# Patient Record
Sex: FEMALE | Race: WHITE | NOT HISPANIC OR LATINO | URBAN - METROPOLITAN AREA
[De-identification: names, ages, dates, MRNs, and addresses within clinical notes are randomized per-mention and may not be internally consistent; named-entity substitution may affect disease eponyms.]

---

## 2022-07-07 ENCOUNTER — EMERGENCY (EMERGENCY)
Facility: HOSPITAL | Age: 72
LOS: 1 days | Discharge: HOME | End: 2022-07-07
Attending: STUDENT IN AN ORGANIZED HEALTH CARE EDUCATION/TRAINING PROGRAM | Admitting: EMERGENCY MEDICINE

## 2022-07-07 VITALS
HEIGHT: 65 IN | OXYGEN SATURATION: 98 % | TEMPERATURE: 98 F | SYSTOLIC BLOOD PRESSURE: 137 MMHG | DIASTOLIC BLOOD PRESSURE: 70 MMHG | HEART RATE: 87 BPM | WEIGHT: 199.96 LBS | RESPIRATION RATE: 18 BRPM
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
## 2022-07-07 DIAGNOSIS — Z88.0 ALLERGY STATUS TO PENICILLIN: ICD-10-CM

## 2022-07-07 DIAGNOSIS — Z88.5 ALLERGY STATUS TO NARCOTIC AGENT: ICD-10-CM

## 2022-07-07 DIAGNOSIS — T83.098A OTHER MECHANICAL COMPLICATION OF OTHER URINARY CATHETER, INITIAL ENCOUNTER: ICD-10-CM
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
## 2022-07-07 DIAGNOSIS — E11.9 TYPE 2 DIABETES MELLITUS WITHOUT COMPLICATIONS: ICD-10-CM

## 2022-07-07 DIAGNOSIS — K21.9 GASTRO-ESOPHAGEAL REFLUX DISEASE WITHOUT ESOPHAGITIS: ICD-10-CM

## 2022-07-07 DIAGNOSIS — H54.7 UNSPECIFIED VISUAL LOSS: ICD-10-CM

## 2022-07-07 NOTE — ED PROVIDER NOTE - NSFOLLOWUPINSTRUCTIONS_ED_ALL_ED_FT
Follow up with your urologist as planned.  Return for fever, chills, abdominal pain, flank pain or other concerning symptoms.

## 2022-07-07 NOTE — ED ADULT TRIAGE NOTE - CHIEF COMPLAINT QUOTE
BIBA after urinary catheter came out around 5pm, does not know if she put it back right. BIBA after urinary catheter came out around 5pm, does not know if she put it back right. pt is blind, has walking stick

## 2022-07-07 NOTE — ED PROVIDER NOTE - PROGRESS NOTE DETAILS
well appearing, benign exam.  sanchez catheter checked w/ RN supervising. sanchez cath in place, draining urine. bag disconnected. will replace bag, dc home, rec continue uro f/u as OP

## 2022-07-07 NOTE — ED ADULT NURSE NOTE - CHIEF COMPLAINT QUOTE
BIBA after urinary catheter came out around 5pm, does not know if she put it back right. pt is blind, has walking stick

## 2022-07-07 NOTE — ED PROVIDER NOTE - PATIENT PORTAL LINK FT
You can access the FollowMyHealth Patient Portal offered by [facility] by registering at the following website: [email or web address]. By joining [email or web address] FollowMyHealth portal, you will also be able to view your health information using other applications (apps) compatible with our system.

## 2022-07-07 NOTE — ED PROVIDER NOTE - OBJECTIVE STATEMENT
70 yo f hx blindness, gerd, dm, urinary retention (normally intermittently straight caths, URO - Dr. Storey)  pt presents for eval of sanchez cath concern. pt was seen at [facility] 1[facility]2 weeks ago and was dx w/ UTI.  pt states she completed abx and uti sx improved. pt states the sanchez catheter fell out today and she was not sure if she placed it in the proper location. otherwise, pt w/o complaints.

## 2022-07-07 NOTE — ED PROVIDER NOTE - CLINICAL SUMMARY MEDICAL DECISION MAKING FREE TEXT BOX
pt well appearing, sanchez examined and disconnected from bag, bag replaced, advised uro f/u, return precautions

## 2022-07-07 NOTE — ED PROVIDER NOTE - PHYSICAL EXAMINATION
vss  gen- NAD, aaox3  card-rrr  lungs-ctab, no wheezing or rhonchi  abd-sntnd, no guarding or rebound, no cvat  neuro- full str/sensation, cn ii-xii grossly intact, normal coordination

## 2022-07-07 NOTE — ED PROVIDER NOTE - NSICDXPASTMEDICALHX_GEN_ALL_CORE_FT
PAST MEDICAL HISTORY:  DM (diabetes mellitus)     GERD (gastroesophageal reflux disease)     Urinary retention